# Patient Record
Sex: MALE | Race: WHITE | NOT HISPANIC OR LATINO | Employment: OTHER | URBAN - NONMETROPOLITAN AREA
[De-identification: names, ages, dates, MRNs, and addresses within clinical notes are randomized per-mention and may not be internally consistent; named-entity substitution may affect disease eponyms.]

---

## 2023-06-05 PROBLEM — R00.1 BRADYCARDIA: Status: ACTIVE | Noted: 2023-06-05

## 2023-06-05 PROBLEM — D64.9 ANEMIA: Status: ACTIVE | Noted: 2023-06-05

## 2023-06-05 PROBLEM — J44.9 COPD (CHRONIC OBSTRUCTIVE PULMONARY DISEASE) (MULTI): Status: ACTIVE | Noted: 2023-06-05

## 2023-06-05 PROBLEM — K21.9 GERD (GASTROESOPHAGEAL REFLUX DISEASE): Status: ACTIVE | Noted: 2023-06-05

## 2023-06-05 PROBLEM — N40.0 BPH (BENIGN PROSTATIC HYPERPLASIA): Status: ACTIVE | Noted: 2023-06-05

## 2023-06-05 PROBLEM — E78.00 HYPERCHOLESTEROLEMIA: Status: ACTIVE | Noted: 2023-06-05

## 2023-06-05 PROBLEM — I10 HYPERTENSION: Status: ACTIVE | Noted: 2023-06-05

## 2023-06-05 RX ORDER — CYANOCOBALAMIN (VITAMIN B-12) 500 MCG
2 TABLET ORAL DAILY
COMMUNITY
Start: 2017-11-16

## 2023-06-05 RX ORDER — LOVASTATIN 40 MG/1
TABLET ORAL
COMMUNITY
Start: 2017-11-16

## 2023-06-05 RX ORDER — AMLODIPINE BESYLATE 10 MG/1
TABLET ORAL
COMMUNITY
Start: 2017-03-17

## 2023-06-05 RX ORDER — NITROGLYCERIN 0.4 MG/1
TABLET SUBLINGUAL
COMMUNITY
Start: 2018-09-25

## 2023-06-05 RX ORDER — ATENOLOL 25 MG/1
TABLET ORAL
COMMUNITY
Start: 2017-03-17

## 2023-06-05 RX ORDER — FLUTICASONE PROPIONATE AND SALMETEROL 250; 50 UG/1; UG/1
POWDER RESPIRATORY (INHALATION)
COMMUNITY
Start: 2021-05-24

## 2023-06-05 RX ORDER — FAMOTIDINE 40 MG/1
1 TABLET, FILM COATED ORAL DAILY
COMMUNITY
Start: 2020-12-02

## 2023-06-05 RX ORDER — ASPIRIN 81 MG/1
1 TABLET ORAL DAILY
COMMUNITY

## 2023-06-05 RX ORDER — TAMSULOSIN HYDROCHLORIDE 0.4 MG/1
1 CAPSULE ORAL NIGHTLY
COMMUNITY
Start: 2020-12-02

## 2023-06-05 RX ORDER — EZETIMIBE 10 MG/1
1 TABLET ORAL DAILY
COMMUNITY
Start: 2018-12-03

## 2023-06-05 RX ORDER — CLOPIDOGREL BISULFATE 75 MG/1
1 TABLET ORAL DAILY
COMMUNITY
Start: 2016-06-13

## 2023-06-07 ENCOUNTER — OFFICE VISIT (OUTPATIENT)
Dept: PRIMARY CARE | Facility: CLINIC | Age: 78
End: 2023-06-07
Payer: MEDICARE

## 2023-06-07 VITALS
DIASTOLIC BLOOD PRESSURE: 64 MMHG | BODY MASS INDEX: 27.76 KG/M2 | SYSTOLIC BLOOD PRESSURE: 112 MMHG | HEART RATE: 67 BPM | WEIGHT: 198.3 LBS | TEMPERATURE: 97.4 F | HEIGHT: 71 IN | OXYGEN SATURATION: 96 %

## 2023-06-07 DIAGNOSIS — Z13.31 DEPRESSION SCREENING: ICD-10-CM

## 2023-06-07 DIAGNOSIS — E66.3 OVERWEIGHT WITH BODY MASS INDEX (BMI) OF 27 TO 27.9 IN ADULT: ICD-10-CM

## 2023-06-07 DIAGNOSIS — Z87.891 PERSONAL HISTORY OF SMOKING: ICD-10-CM

## 2023-06-07 DIAGNOSIS — I10 PRIMARY HYPERTENSION: ICD-10-CM

## 2023-06-07 DIAGNOSIS — J42 CHRONIC BRONCHITIS, UNSPECIFIED CHRONIC BRONCHITIS TYPE (MULTI): ICD-10-CM

## 2023-06-07 DIAGNOSIS — Z00.00 ROUTINE GENERAL MEDICAL EXAMINATION AT HEALTH CARE FACILITY: Primary | ICD-10-CM

## 2023-06-07 DIAGNOSIS — Z00.00 VISIT FOR PREVENTIVE HEALTH EXAMINATION: ICD-10-CM

## 2023-06-07 PROCEDURE — 1159F MED LIST DOCD IN RCRD: CPT | Performed by: NURSE PRACTITIONER

## 2023-06-07 PROCEDURE — 1160F RVW MEDS BY RX/DR IN RCRD: CPT | Performed by: NURSE PRACTITIONER

## 2023-06-07 PROCEDURE — G0439 PPPS, SUBSEQ VISIT: HCPCS | Performed by: NURSE PRACTITIONER

## 2023-06-07 PROCEDURE — 1170F FXNL STATUS ASSESSED: CPT | Performed by: NURSE PRACTITIONER

## 2023-06-07 PROCEDURE — 3078F DIAST BP <80 MM HG: CPT | Performed by: NURSE PRACTITIONER

## 2023-06-07 PROCEDURE — 4004F PT TOBACCO SCREEN RCVD TLK: CPT | Performed by: NURSE PRACTITIONER

## 2023-06-07 PROCEDURE — 1123F ACP DISCUSS/DSCN MKR DOCD: CPT | Performed by: NURSE PRACTITIONER

## 2023-06-07 PROCEDURE — 99397 PER PM REEVAL EST PAT 65+ YR: CPT | Performed by: NURSE PRACTITIONER

## 2023-06-07 PROCEDURE — 3074F SYST BP LT 130 MM HG: CPT | Performed by: NURSE PRACTITIONER

## 2023-06-07 ASSESSMENT — ACTIVITIES OF DAILY LIVING (ADL)
DOING_HOUSEWORK: INDEPENDENT
DRESSING: INDEPENDENT
BATHING: INDEPENDENT
MANAGING_FINANCES: INDEPENDENT
TAKING_MEDICATION: INDEPENDENT
GROCERY_SHOPPING: INDEPENDENT

## 2023-06-07 ASSESSMENT — ENCOUNTER SYMPTOMS
DEPRESSION: 0
LOSS OF SENSATION IN FEET: 0
OCCASIONAL FEELINGS OF UNSTEADINESS: 0

## 2023-06-07 ASSESSMENT — PATIENT HEALTH QUESTIONNAIRE - PHQ9
2. FEELING DOWN, DEPRESSED OR HOPELESS: NOT AT ALL
1. LITTLE INTEREST OR PLEASURE IN DOING THINGS: NOT AT ALL
SUM OF ALL RESPONSES TO PHQ9 QUESTIONS 1 AND 2: 0

## 2023-06-07 NOTE — PROGRESS NOTES
Subjective   Reason for Visit: Giovanni Lester is an 78 y.o. male here for a Medicare Wellness visit.     Past Medical, Surgical, and Family History reviewed and updated in chart.    Reviewed all medications by prescribing practitioner or clinical pharmacist (such as prescriptions, OTCs, herbal therapies and supplements) and documented in the medical record.    Medicare Physical.  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 He is not interested in quitting. CT lung cancer screening in 2022 showed stable lung nodules. Due for Patient has 100-pack-year history of smoking.  COPD, improved with Advair twice a day, using Combivent as needed. Strongly encourage patient to stop smoking.   History of MI, sees Dr. Mcmahon for cardiology. Patient is on Plavix.  Hypertension, controlled, taking amlodipine 5 mg daily, atenolol 12.5 mg daily.  [facility]  History of skin cancer, follows with [facility].  I spent 15 minutes obtaining and discussing depression screening using PHQ-2 questions with results documented in chart.   I spent more than 3 minutes (but less than 10 minutes) counseling patient about need for smoking/tobacco cessation and how I can support efforts when patient is ready to quit. Discussed nicotine replacement therapy, Varenicline, Bupropion, hypnosis, support groups, and acupuncture as potential options. Patient currently has no signs or symptoms of tobacco related disease.    Preventive:  CRC screen: [place]ilda 2021 negative  Lung cancer screenin2022          Patient Care Team:  PATT Rojo DNP as PCP - General  PATT Rojo DNP as PCP - [employer] PCP     Review of Systems   Constitutional:  Negative for activity change, chills, fatigue and unexpected  "weight change.   HENT:  Negative for congestion, ear pain and sore throat.    Eyes: Negative.    Respiratory:  Positive for cough and shortness of breath. Negative for wheezing.    Cardiovascular:  Negative for chest pain, palpitations and leg swelling.   Gastrointestinal: Negative.  Negative for abdominal pain, constipation, diarrhea, nausea and vomiting.   Endocrine: Negative.    Genitourinary: Negative.    Musculoskeletal:  Positive for arthralgias and joint swelling.   Skin: Negative.    Allergic/Immunologic: Negative.    Neurological:  Negative for dizziness, speech difficulty, numbness and headaches.   Hematological: Negative.    Psychiatric/Behavioral: Negative.     All other systems reviewed and are negative.      Objective   Vitals:  BP 90/60   Pulse 67   Temp 36.3 °C (97.4 °F)   Ht 1.803 m (5' 11\")   Wt 89.9 kg (198 lb 4.8 oz)   SpO2 96%   BMI 27.66 kg/m²       Physical Exam  Vitals and nursing note reviewed.   Constitutional:       General: He is not in acute distress.     Appearance: Normal appearance. He is normal weight. He is not ill-appearing.   HENT:      Head: Normocephalic and atraumatic.      Right Ear: Tympanic membrane, ear canal and external ear normal.      Left Ear: Tympanic membrane, ear canal and external ear normal.      Nose: Nose normal.      Mouth/Throat:      Mouth: Mucous membranes are moist.      Pharynx: Oropharynx is clear.   Eyes:      Extraocular Movements: Extraocular movements intact.      Conjunctiva/sclera: Conjunctivae normal.      Pupils: Pupils are equal, round, and reactive to light.   Cardiovascular:      Rate and Rhythm: Normal rate and regular rhythm.      Pulses: Normal pulses.      Heart sounds: Normal heart sounds. No murmur heard.  Pulmonary:      Effort: Pulmonary effort is normal. No respiratory distress.      Breath sounds: Wheezing present.   Abdominal:      General: Bowel sounds are normal. There is no distension.      Palpations: Abdomen is soft.      " Tenderness: There is no abdominal tenderness.   Musculoskeletal:         General: No tenderness. Normal range of motion.      Cervical back: Normal range of motion and neck supple.      Right lower leg: No edema.      Left lower leg: No edema.   Skin:     General: Skin is warm and dry.      Capillary Refill: Capillary refill takes less than 2 seconds.   Neurological:      General: No focal deficit present.      Mental Status: He is alert and oriented to person, place, and time.   Psychiatric:         Mood and Affect: Mood normal.         Behavior: Behavior normal.         Thought Content: Thought content normal.         Judgment: Judgment normal.         Assessment/Plan     # COPD  -Continue Advair every 12 hours  -Continue Combivent as needed  -Strongly encouraged to stop smoking  -CT lung cancer screening repeat June 2023  # Hypertension, controlled  -Continue amlodipine 5 mg daily and atenolol 12.5 mg daily  -Low fat/cholesterol, low sodium diet  -Exercise as tolerated 150 minutes/week, increase activity slowly  -Maintain BMI 20-25  -Strongly encouraged to stop smoking  # CAD, MI  -follow with cardiology  # Skin cancer  -Follow with dermatology     Follow-up in 6 months and as needed

## 2023-06-10 ASSESSMENT — ENCOUNTER SYMPTOMS
CHILLS: 0
UNEXPECTED WEIGHT CHANGE: 0
DIARRHEA: 0
PSYCHIATRIC NEGATIVE: 1
JOINT SWELLING: 1
ACTIVITY CHANGE: 0
PALPITATIONS: 0
HEMATOLOGIC/LYMPHATIC NEGATIVE: 1
SHORTNESS OF BREATH: 1
GASTROINTESTINAL NEGATIVE: 1
NUMBNESS: 0
COUGH: 1
CONSTIPATION: 0
WHEEZING: 0
EYES NEGATIVE: 1
ENDOCRINE NEGATIVE: 1
ARTHRALGIAS: 1
DIZZINESS: 0
ABDOMINAL PAIN: 0
NAUSEA: 0
FATIGUE: 0
SPEECH DIFFICULTY: 0
VOMITING: 0
ALLERGIC/IMMUNOLOGIC NEGATIVE: 1
SORE THROAT: 0
HEADACHES: 0

## 2024-10-18 ENCOUNTER — HOSPITAL ENCOUNTER (OUTPATIENT)
Dept: RADIOLOGY | Facility: HOSPITAL | Age: 79
Discharge: HOME | End: 2024-10-18
Payer: OTHER GOVERNMENT

## 2024-10-18 DIAGNOSIS — Z87.891 PERSONAL HISTORY OF NICOTINE DEPENDENCE: ICD-10-CM

## 2024-10-18 DIAGNOSIS — F17.200 NICOTINE DEPENDENCE, UNSPECIFIED, UNCOMPLICATED: ICD-10-CM

## 2024-10-18 PROCEDURE — 71271 CT THORAX LUNG CANCER SCR C-: CPT

## 2024-10-28 ENCOUNTER — APPOINTMENT (OUTPATIENT)
Dept: PRIMARY CARE | Facility: CLINIC | Age: 79
End: 2024-10-28
Payer: MEDICARE

## 2024-12-03 ENCOUNTER — APPOINTMENT (OUTPATIENT)
Dept: PRIMARY CARE | Facility: CLINIC | Age: 79
End: 2024-12-03
Payer: OTHER GOVERNMENT

## 2024-12-03 VITALS
OXYGEN SATURATION: 95 % | BODY MASS INDEX: 26.89 KG/M2 | DIASTOLIC BLOOD PRESSURE: 68 MMHG | TEMPERATURE: 97.5 F | HEART RATE: 50 BPM | SYSTOLIC BLOOD PRESSURE: 106 MMHG | WEIGHT: 192.8 LBS

## 2024-12-03 DIAGNOSIS — F17.211 NICOTINE DEPENDENCE, CIGARETTES, IN REMISSION: ICD-10-CM

## 2024-12-03 DIAGNOSIS — I25.83 CORONARY ARTERY DISEASE DUE TO LIPID RICH PLAQUE: ICD-10-CM

## 2024-12-03 DIAGNOSIS — R91.1 LUNG NODULE: ICD-10-CM

## 2024-12-03 DIAGNOSIS — Z00.00 ROUTINE GENERAL MEDICAL EXAMINATION AT HEALTH CARE FACILITY: Primary | ICD-10-CM

## 2024-12-03 DIAGNOSIS — I25.10 CORONARY ARTERY DISEASE DUE TO LIPID RICH PLAQUE: ICD-10-CM

## 2024-12-03 DIAGNOSIS — Z87.891 PERSONAL HISTORY OF SMOKING: ICD-10-CM

## 2024-12-03 DIAGNOSIS — I10 PRIMARY HYPERTENSION: ICD-10-CM

## 2024-12-03 PROCEDURE — 1160F RVW MEDS BY RX/DR IN RCRD: CPT | Performed by: INTERNAL MEDICINE

## 2024-12-03 PROCEDURE — 3078F DIAST BP <80 MM HG: CPT | Performed by: INTERNAL MEDICINE

## 2024-12-03 PROCEDURE — 99397 PER PM REEVAL EST PAT 65+ YR: CPT | Performed by: INTERNAL MEDICINE

## 2024-12-03 PROCEDURE — G0439 PPPS, SUBSEQ VISIT: HCPCS | Performed by: INTERNAL MEDICINE

## 2024-12-03 PROCEDURE — 99406 BEHAV CHNG SMOKING 3-10 MIN: CPT | Performed by: INTERNAL MEDICINE

## 2024-12-03 PROCEDURE — 1158F ADVNC CARE PLAN TLK DOCD: CPT | Performed by: INTERNAL MEDICINE

## 2024-12-03 PROCEDURE — 3074F SYST BP LT 130 MM HG: CPT | Performed by: INTERNAL MEDICINE

## 2024-12-03 PROCEDURE — 1123F ACP DISCUSS/DSCN MKR DOCD: CPT | Performed by: INTERNAL MEDICINE

## 2024-12-03 PROCEDURE — 1159F MED LIST DOCD IN RCRD: CPT | Performed by: INTERNAL MEDICINE

## 2024-12-03 PROCEDURE — 1170F FXNL STATUS ASSESSED: CPT | Performed by: INTERNAL MEDICINE

## 2024-12-03 PROCEDURE — 99214 OFFICE O/P EST MOD 30 MIN: CPT | Performed by: INTERNAL MEDICINE

## 2024-12-03 PROCEDURE — 1157F ADVNC CARE PLAN IN RCRD: CPT | Performed by: INTERNAL MEDICINE

## 2024-12-03 ASSESSMENT — ACTIVITIES OF DAILY LIVING (ADL)
MANAGING_FINANCES: INDEPENDENT
BATHING: INDEPENDENT
DOING_HOUSEWORK: INDEPENDENT
GROCERY_SHOPPING: INDEPENDENT
DRESSING: INDEPENDENT
TAKING_MEDICATION: INDEPENDENT

## 2024-12-03 ASSESSMENT — ENCOUNTER SYMPTOMS
DIARRHEA: 0
SORE THROAT: 0
BLOOD IN STOOL: 0
WHEEZING: 0
BRUISES/BLEEDS EASILY: 0
FEVER: 0
UNEXPECTED WEIGHT CHANGE: 0
PALPITATIONS: 0
ARTHRALGIAS: 0
ABDOMINAL PAIN: 0
DIFFICULTY URINATING: 0
DIZZINESS: 0
FATIGUE: 0
HEADACHES: 0
SINUS PAIN: 0
COUGH: 0

## 2024-12-03 ASSESSMENT — PATIENT HEALTH QUESTIONNAIRE - PHQ9
1. LITTLE INTEREST OR PLEASURE IN DOING THINGS: NOT AT ALL
SUM OF ALL RESPONSES TO PHQ9 QUESTIONS 1 AND 2: 0
2. FEELING DOWN, DEPRESSED OR HOPELESS: NOT AT ALL

## 2024-12-03 NOTE — PROGRESS NOTES
"Subjective   Reason for Visit: Giovanni Lester is an 79 y.o. male here for a Medicare Wellness visit.     Past Medical, Surgical, and Family History reviewed and updated in chart.    Reviewed all medications by prescribing practitioner or clinical pharmacist (such as prescriptions, OTCs, herbal therapies and supplements) and documented in the medical record.     New patient to my office old records reviewed  Annual Medicare physical  And preventative visit  Vaccination reviewed up-to-date patient [facility]  - [facility]  - Screen for colon cancer obtained last Cologuard negative patient asymptomatic continue monitor conservatively  - Nicotine dependency  \"I spent more3 minutes counseling patient about need for smoking/tobacco cessation and how I can support efforts when patient is ready to quit.  Discussed nicotine replacement therapy, Varenicline, Bupropion, hypnosis, support groups, and accupunture as potential options.  Patient currently has no signs or symptoms of tobacco related disease.\".  -Lung cancer surveillance up to date in October 2024 recommendation to follow-up CT scan 3 months order placed today follow-up results closely  - Advance directive reviewed    Follow-up  - COPD  -Continue Advair every 12 hours  -Continue Combivent as needed  --Hypertension controlled continue amlodipine and atenolol  Continue low-salt diet  Exercise and weight loss  - Coronary artery disease history of MI stable continue with Plavix counseled on smoking cessation follow-up cardiology  - History of skin cancer in remission  -Follow with dermatology  Lung nodule follow-up CT  Smoking cessation follow-up closely reevaluate patient in 3 months          Patient Care Team:  Addis Greene MD as PCP - General (Internal Medicine)  TIM Rojo-CNP, DNP as PCP - [employer] PCP     Review of Systems   Constitutional:  Negative for fatigue, fever " and unexpected weight change.   HENT:  Negative for congestion, ear discharge, ear pain, mouth sores, sinus pain and sore throat.    Eyes:  Negative for visual disturbance.   Respiratory:  Negative for cough and wheezing.    Cardiovascular:  Negative for chest pain, palpitations and leg swelling.   Gastrointestinal:  Negative for abdominal pain, blood in stool and diarrhea.   Genitourinary:  Negative for difficulty urinating.   Musculoskeletal:  Negative for arthralgias.   Skin:  Negative for rash.   Neurological:  Negative for dizziness and headaches.   Hematological:  Does not bruise/bleed easily.   Psychiatric/Behavioral:  Negative for behavioral problems.    All other systems reviewed and are negative.      Objective   Vitals:  /68   Pulse 50   Temp 36.4 °C (97.5 °F)   Wt 87.5 kg (192 lb 12.8 oz)   SpO2 95%   BMI 26.89 kg/m²       Physical Exam  Vitals and nursing note reviewed.   Constitutional:       Appearance: Normal appearance.   HENT:      Head: Normocephalic.      Nose: Nose normal.   Eyes:      Conjunctiva/sclera: Conjunctivae normal.      Pupils: Pupils are equal, round, and reactive to light.   Cardiovascular:      Rate and Rhythm: Regular rhythm.   Pulmonary:      Effort: Pulmonary effort is normal.      Breath sounds: Normal breath sounds.   Abdominal:      General: Abdomen is flat.      Palpations: Abdomen is soft.   Musculoskeletal:      Cervical back: Neck supple.   Skin:     General: Skin is warm.   Neurological:      General: No focal deficit present.      Mental Status: He is oriented to person, place, and time.   Psychiatric:         Mood and Affect: Mood normal.         Assessment & Plan  Routine general medical examination at health care facility    Orders:    1 Year Follow Up In Primary Care - Wellness Exam; Future    Personal history of smoking         Primary hypertension         Coronary artery disease due to lipid rich plaque         Lung nodule    Orders:    CT lung  "screening low dose; Future    Nicotine dependence, cigarettes, in remission    Orders:    CT lung screening low dose; Future        New patient to my office old records reviewed  Annual Medicare physical  And preventative visit  Vaccination reviewed up-to-date patient [facility]  - [facility]  - Screen for colon cancer obtained last Cologuard negative patient asymptomatic continue monitor conservatively  - Nicotine dependency  \"I spent more3 minutes counseling patient about need for smoking/tobacco cessation and how I can support efforts when patient is ready to quit.  Discussed nicotine replacement therapy, Varenicline, Bupropion, hypnosis, support groups, and accupunture as potential options.  Patient currently has no signs or symptoms of tobacco related disease.\".  -Lung cancer surveillance up to date in October 2024 recommendation to follow-up CT scan 3 months order placed today follow-up results closely  - Advance directive reviewed    Follow-up  - COPD  -Continue Advair every 12 hours  -Continue Combivent as needed  --Hypertension controlled continue amlodipine and atenolol  Continue low-salt diet  Exercise and weight loss  - Coronary artery disease history of MI stable continue with Plavix counseled on smoking cessation follow-up cardiology  - History of skin cancer in remission  -Follow with dermatology  Lung nodule follow-up CT  Smoking cessation follow-up closely reevaluate patient in 3 months         "

## 2024-12-03 NOTE — PROGRESS NOTES
Subjective   Patient ID: Giovanni Lester is a 79 y.o. male who presents for Medicare Annual Wellness Visit Subsequent.    HPI       Review of Systems    Objective   Lab Results   Component Value Date    HGBA1C 5.9 11/16/2017      /68   Pulse 50   Temp 36.4 °C (97.5 °F)   Wt 87.5 kg (192 lb 12.8 oz)   SpO2 95%   BMI 26.89 kg/m²     Physical Exam    Assessment/Plan   There are no diagnoses linked to this encounter.

## 2024-12-23 ENCOUNTER — TELEPHONE (OUTPATIENT)
Dept: RADIOLOGY | Facility: HOSPITAL | Age: 79
End: 2024-12-23
Payer: OTHER GOVERNMENT

## 2024-12-23 NOTE — TELEPHONE ENCOUNTER
Epic message to the patient provider to order the follow up CT of the chest. Currently ordered is the annual low dose CT of the chest.

## 2024-12-24 DIAGNOSIS — R91.1 LUNG NODULE: Primary | ICD-10-CM

## 2025-01-20 ENCOUNTER — APPOINTMENT (OUTPATIENT)
Dept: RADIOLOGY | Facility: HOSPITAL | Age: 80
End: 2025-01-20
Payer: MEDICARE

## 2025-01-20 ENCOUNTER — HOSPITAL ENCOUNTER (OUTPATIENT)
Dept: RADIOLOGY | Facility: HOSPITAL | Age: 80
Discharge: HOME | End: 2025-01-20
Payer: MEDICARE

## 2025-01-20 DIAGNOSIS — R91.1 LUNG NODULE: ICD-10-CM

## 2025-01-20 PROCEDURE — 76380 CAT SCAN FOLLOW-UP STUDY: CPT | Performed by: RADIOLOGY

## 2025-01-20 PROCEDURE — 71250 CT THORAX DX C-: CPT

## 2025-01-21 DIAGNOSIS — R91.8 LUNG NODULES: Primary | ICD-10-CM

## 2025-01-24 ENCOUNTER — TELEPHONE (OUTPATIENT)
Dept: RADIOLOGY | Facility: HOSPITAL | Age: 80
End: 2025-01-24
Payer: MEDICARE

## 2025-03-04 ENCOUNTER — APPOINTMENT (OUTPATIENT)
Dept: PRIMARY CARE | Facility: CLINIC | Age: 80
End: 2025-03-04
Payer: OTHER GOVERNMENT

## 2025-03-04 VITALS
BODY MASS INDEX: 26.64 KG/M2 | HEART RATE: 50 BPM | OXYGEN SATURATION: 96 % | WEIGHT: 191 LBS | TEMPERATURE: 97.4 F | DIASTOLIC BLOOD PRESSURE: 64 MMHG | SYSTOLIC BLOOD PRESSURE: 128 MMHG

## 2025-03-04 DIAGNOSIS — I25.83 CORONARY ARTERY DISEASE DUE TO LIPID RICH PLAQUE: ICD-10-CM

## 2025-03-04 DIAGNOSIS — I25.10 CORONARY ARTERY DISEASE DUE TO LIPID RICH PLAQUE: ICD-10-CM

## 2025-03-04 DIAGNOSIS — F17.211 NICOTINE DEPENDENCE, CIGARETTES, IN REMISSION: ICD-10-CM

## 2025-03-04 DIAGNOSIS — R91.1 LUNG NODULE: ICD-10-CM

## 2025-03-04 DIAGNOSIS — J42 CHRONIC BRONCHITIS, UNSPECIFIED CHRONIC BRONCHITIS TYPE (MULTI): ICD-10-CM

## 2025-03-04 DIAGNOSIS — I10 PRIMARY HYPERTENSION: Primary | ICD-10-CM

## 2025-03-04 PROCEDURE — 1159F MED LIST DOCD IN RCRD: CPT | Performed by: INTERNAL MEDICINE

## 2025-03-04 PROCEDURE — 3078F DIAST BP <80 MM HG: CPT | Performed by: INTERNAL MEDICINE

## 2025-03-04 PROCEDURE — 1160F RVW MEDS BY RX/DR IN RCRD: CPT | Performed by: INTERNAL MEDICINE

## 2025-03-04 PROCEDURE — 1157F ADVNC CARE PLAN IN RCRD: CPT | Performed by: INTERNAL MEDICINE

## 2025-03-04 PROCEDURE — G2211 COMPLEX E/M VISIT ADD ON: HCPCS | Performed by: INTERNAL MEDICINE

## 2025-03-04 PROCEDURE — 3074F SYST BP LT 130 MM HG: CPT | Performed by: INTERNAL MEDICINE

## 2025-03-04 PROCEDURE — 99214 OFFICE O/P EST MOD 30 MIN: CPT | Performed by: INTERNAL MEDICINE

## 2025-03-04 ASSESSMENT — ENCOUNTER SYMPTOMS: HYPERTENSION: 1

## 2025-03-04 NOTE — PROGRESS NOTES
"Subjective   Patient ID: Giovanni Lester is a 80 y.o. male who presents for Hyperlipidemia, Hypertension, and Results (CT).    - Recent blood work and plan of care reviewed  Low-dose lung cancer screening showed lung nodule need to follow-up in 6 months patient [facility] have [facility] done at [facility] for coverage will follow-up with patient closely as needed  - Screen for colon cancer obtained last Cologuard negative patient asymptomatic continue monitor conservatively  - Nicotine dependency  \"I spent more3 minutes counseling patient about need for smoking/tobacco cessation and how I can support efforts when patient is ready to quit.  Discussed nicotine replacement therapy, Varenicline, Bupropion, hypnosis, support groups, and accupunture as potential options.  Patient currently has no signs or symptoms of tobacco related disease.\".  --COPD,-Continue Advair every 12 hours Combivent as needed  --Hypertension controlled continue amlodipine and atenolol  Continue low-salt diet  Exercise and weight loss  - Coronary artery disease history of MI stable continue with Plavix counseled on smoking cessation follow-up cardiology  - History of skin cancer in remission  -Follow with dermatology  -Lung nodule follow-up CT 6 months as scheduled patient [facility]  Follow-up 6 months    Hyperlipidemia  Pertinent negatives include no chest pain.   Hypertension  Pertinent negatives include no chest pain, headaches or palpitations.          Review of Systems   Constitutional:  Negative for fatigue, fever and unexpected weight change.   HENT:  Negative for congestion, ear discharge, ear pain, mouth sores, sinus pain and sore throat.    Eyes:  Negative for visual disturbance.   Respiratory:  Negative for cough and wheezing.    Cardiovascular:  Negative for chest pain, palpitations and leg swelling.   Gastrointestinal:  Negative for abdominal pain, blood in stool and " diarrhea.   Genitourinary:  Negative for difficulty urinating.   Musculoskeletal:  Negative for arthralgias.   Skin:  Negative for rash.   Neurological:  Negative for dizziness and headaches.   Hematological:  Does not bruise/bleed easily.   Psychiatric/Behavioral:  Negative for behavioral problems.    All other systems reviewed and are negative.    Lab Results   Component Value Date    WBC 12.9 (H) 10/06/2020    HGB 12.3 (L) 10/06/2020    HCT 36.5 (L) 10/06/2020     10/06/2020     10/06/2020    K 4.1 10/06/2020     10/06/2020    CREATININE 0.98 10/06/2020    BUN 19 10/06/2020    CO2 25 10/06/2020    HGBA1C 5.9 11/16/2017     par   Objective   Lab Results   Component Value Date    HGBA1C 5.9 11/16/2017      /64   Pulse 50   Temp 36.3 °C (97.4 °F)   Wt 86.6 kg (191 lb)   SpO2 96%   BMI 26.64 kg/m²     Physical Exam  Vitals and nursing note reviewed.   Constitutional:       Appearance: Normal appearance.   HENT:      Head: Normocephalic.      Nose: Nose normal.   Eyes:      Conjunctiva/sclera: Conjunctivae normal.      Pupils: Pupils are equal, round, and reactive to light.   Cardiovascular:      Rate and Rhythm: Regular rhythm.   Pulmonary:      Effort: Pulmonary effort is normal.      Breath sounds: Normal breath sounds.   Abdominal:      General: Abdomen is flat.      Palpations: Abdomen is soft.   Musculoskeletal:      Cervical back: Neck supple.   Skin:     General: Skin is warm.   Neurological:      General: No focal deficit present.      Mental Status: He is oriented to person, place, and time.   Psychiatric:         Mood and Affect: Mood normal.         Assessment/Plan   Giovanni was seen today for hyperlipidemia, hypertension and results.  Diagnoses and all orders for this visit:  Primary hypertension (Primary)  Chronic bronchitis, unspecified chronic bronchitis type (Multi)  Nicotine dependence, cigarettes, in remission  Coronary artery disease due to lipid rich plaque  Lung  "nodule  Other orders  -     Follow Up In Primary Care - Established  -     Follow Up In Primary Care - Established; Future    Recent blood work and plan of care reviewed  Low-dose lung cancer screening showed lung nodule need to follow-up in 6 months patient [facility] will follow-up with patient closely as needed  - Screen for colon cancer obtained last Cologuard negative patient asymptomatic continue monitor conservatively  - Nicotine dependency  \"I spent more3 minutes counseling patient about need for smoking/tobacco cessation and how I can support efforts when patient is ready to quit.  Discussed nicotine replacement therapy, Varenicline, Bupropion, hypnosis, support groups, and accupunture as potential options.  Patient currently has no signs or symptoms of tobacco related disease.\".  --COPD,-Continue Advair every 12 hours Combivent as needed  --Hypertension controlled continue amlodipine and atenolol  Continue low-salt diet  Exercise and weight loss  - Coronary artery disease history of MI stable continue with Plavix counseled on smoking cessation follow-up cardiology  - History of skin cancer in remission  -Follow with dermatology  -Lung nodule follow-up CT 6 months as scheduled patient will [facility]  Follow-up 6 months  "

## 2025-03-05 ASSESSMENT — ENCOUNTER SYMPTOMS
BLOOD IN STOOL: 0
ARTHRALGIAS: 0
BRUISES/BLEEDS EASILY: 0
SORE THROAT: 0
PALPITATIONS: 0
DIARRHEA: 0
ABDOMINAL PAIN: 0
HEADACHES: 0
COUGH: 0
DIZZINESS: 0
SINUS PAIN: 0
UNEXPECTED WEIGHT CHANGE: 0
FEVER: 0
FATIGUE: 0
WHEEZING: 0
DIFFICULTY URINATING: 0

## 2025-09-09 ENCOUNTER — APPOINTMENT (OUTPATIENT)
Dept: PRIMARY CARE | Facility: CLINIC | Age: 80
End: 2025-09-09
Payer: MEDICARE